# Patient Record
Sex: FEMALE | Race: OTHER | Employment: FULL TIME | URBAN - METROPOLITAN AREA
[De-identification: names, ages, dates, MRNs, and addresses within clinical notes are randomized per-mention and may not be internally consistent; named-entity substitution may affect disease eponyms.]

---

## 2017-03-13 ENCOUNTER — APPOINTMENT (OUTPATIENT)
Dept: CT IMAGING | Facility: HOSPITAL | Age: 47
End: 2017-03-13
Attending: EMERGENCY MEDICINE
Payer: COMMERCIAL

## 2017-03-13 ENCOUNTER — HOSPITAL ENCOUNTER (EMERGENCY)
Facility: HOSPITAL | Age: 47
Discharge: HOME | End: 2017-03-13
Attending: EMERGENCY MEDICINE
Payer: COMMERCIAL

## 2017-03-13 VITALS
HEART RATE: 74 BPM | OXYGEN SATURATION: 99 % | WEIGHT: 150 LBS | BODY MASS INDEX: 23.54 KG/M2 | SYSTOLIC BLOOD PRESSURE: 116 MMHG | HEIGHT: 67 IN | RESPIRATION RATE: 14 BRPM | DIASTOLIC BLOOD PRESSURE: 70 MMHG | TEMPERATURE: 97 F

## 2017-03-13 DIAGNOSIS — S16.1XXA CERVICAL STRAIN, INITIAL ENCOUNTER: ICD-10-CM

## 2017-03-13 DIAGNOSIS — S06.0X0A CONCUSSION, WITHOUT LOSS OF CONSCIOUSNESS, INITIAL ENCOUNTER: Primary | ICD-10-CM

## 2017-03-13 PROCEDURE — 99284 EMERGENCY DEPT VISIT MOD MDM: CPT

## 2017-03-13 PROCEDURE — 70450 CT HEAD/BRAIN W/O DYE: CPT

## 2017-03-13 PROCEDURE — 72125 CT NECK SPINE W/O DYE: CPT

## 2017-03-13 RX ORDER — ONDANSETRON 4 MG/1
4 TABLET, ORALLY DISINTEGRATING ORAL ONCE
Status: COMPLETED | OUTPATIENT
Start: 2017-03-13 | End: 2017-03-13

## 2017-03-14 NOTE — ED INITIAL ASSESSMENT (HPI)
Pt states was shoveling snow when she fell backwards striking her head on the concrete, denies LOC. Contusion to left parietal area, also c/o feeling nausea.

## 2017-03-14 NOTE — ED PROVIDER NOTES
Patient Seen in: [facility] Emergency Department    History   Patient presents with:  Head Neck Injury (neurologic, musculoskeletal)    Stated Complaint: FALL ON ICE     HPI    Patient is a 28-year-old female comes in emergency room for evaluation o Oropharynx is unremarkable, no exudate, dentition intact, no facial bone tenderness or septal hematomas, TMs clear bilaterally  NECK: Patient does have some midline cervical spine tenderness.   LUNG: Lungs clear to auscultation bilaterally, no wheezing, no extra-axial fluid collection. There is no evident fracture. Small retention cyst of the left maxillary sinus. Otherwise, the visualized paranasal sinuses and mastoid air cells are unremarkable.       3/13/2017  CONCLUSION:   #1. No acute intracranial hemo instructions. Patient was screened and evaluated during this visit.    As a treating physician attending to the patient, I determined, within reasonable clinical confidence and prior to discharge, that an emergency medical condition was not or was no paulina

## 2018-10-04 ENCOUNTER — CHARTING TRANS (OUTPATIENT)
Dept: OTHER | Age: 48
End: 2018-10-04

## 2018-10-04 ENCOUNTER — LAB SERVICES (OUTPATIENT)
Dept: OTHER | Age: 48
End: 2018-10-04

## 2018-10-04 LAB
APPEARANCE: NORMAL
BILIRUBIN: NORMAL
COLOR: NORMAL
GLUCOSE U: NORMAL
KETONES: NORMAL
LEUKOCYTES: NORMAL
NITRITE: NORMAL
PH: 6
PROTEIN: NORMAL
URINE SPEC GRAVITY: 1.01
UROBILINOGEN: 0.2

## 2018-10-15 ENCOUNTER — CHARTING TRANS (OUTPATIENT)
Dept: OTHER | Age: 48
End: 2018-10-15

## 2018-10-15 ENCOUNTER — LAB SERVICES (OUTPATIENT)
Dept: OTHER | Age: 48
End: 2018-10-15

## 2018-10-15 LAB
APPEARANCE: CLEAR
BILIRUBIN: NEGATIVE
COLOR: NORMAL
GLUCOSE U: NEGATIVE
KETONES: NEGATIVE
LEUKOCYTES: NORMAL
NITRITE: NEGATIVE
OCCULT BLOOD: NORMAL
PH: 5
PROTEIN: NEGATIVE
URINE SPEC GRAVITY: 1
UROBILINOGEN: 0.2

## 2018-10-24 ENCOUNTER — LAB SERVICES (OUTPATIENT)
Dept: OTHER | Age: 48
End: 2018-10-24

## 2018-10-24 ENCOUNTER — CHARTING TRANS (OUTPATIENT)
Dept: OTHER | Age: 48
End: 2018-10-24

## 2018-10-24 LAB
BILIRUBIN: NEGATIVE
GLUCOSE U: NEGATIVE
KETONES: NEGATIVE
LEUKOCYTES: NEGATIVE
NITRITE: NEGATIVE
OCCULT BLOOD: NORMAL
PH: 8
PROTEIN: NEGATIVE
URINE SPEC GRAVITY: 1
UROBILINOGEN: 0.2

## 2018-11-27 VITALS
WEIGHT: 143 LBS | HEART RATE: 88 BPM | TEMPERATURE: 98.7 F | RESPIRATION RATE: 16 BRPM | BODY MASS INDEX: 22.44 KG/M2 | DIASTOLIC BLOOD PRESSURE: 80 MMHG | SYSTOLIC BLOOD PRESSURE: 120 MMHG | HEIGHT: 67 IN

## 2018-11-27 VITALS
RESPIRATION RATE: 16 BRPM | BODY MASS INDEX: 22.4 KG/M2 | TEMPERATURE: 97.9 F | DIASTOLIC BLOOD PRESSURE: 76 MMHG | SYSTOLIC BLOOD PRESSURE: 118 MMHG | WEIGHT: 143 LBS | HEART RATE: 76 BPM

## 2018-11-27 VITALS
BODY MASS INDEX: 22.44 KG/M2 | DIASTOLIC BLOOD PRESSURE: 78 MMHG | SYSTOLIC BLOOD PRESSURE: 130 MMHG | RESPIRATION RATE: 16 BRPM | HEIGHT: 67 IN | WEIGHT: 143 LBS | TEMPERATURE: 98.2 F | HEART RATE: 80 BPM

## 2021-06-30 ENCOUNTER — OFFICE VISIT (OUTPATIENT)
Dept: URGENT CARE | Age: 51
End: 2021-06-30

## 2021-06-30 ENCOUNTER — TELEPHONE (OUTPATIENT)
Dept: SCHEDULING | Age: 51
End: 2021-06-30

## 2024-07-08 ENCOUNTER — TELEPHONE (OUTPATIENT)
Dept: INTERNAL MEDICINE CLINIC | Facility: CLINIC | Age: 54
End: 2024-07-08

## 2024-07-08 ENCOUNTER — OFFICE VISIT (OUTPATIENT)
Dept: INTERNAL MEDICINE CLINIC | Facility: CLINIC | Age: 54
End: 2024-07-08
Payer: COMMERCIAL

## 2024-07-08 VITALS
SYSTOLIC BLOOD PRESSURE: 120 MMHG | BODY MASS INDEX: 26.45 KG/M2 | WEIGHT: 162.63 LBS | OXYGEN SATURATION: 97 % | RESPIRATION RATE: 15 BRPM | HEIGHT: 65.75 IN | DIASTOLIC BLOOD PRESSURE: 60 MMHG | HEART RATE: 76 BPM | TEMPERATURE: 98 F

## 2024-07-08 DIAGNOSIS — Z12.11 COLON CANCER SCREENING: ICD-10-CM

## 2024-07-08 DIAGNOSIS — Z12.31 ENCOUNTER FOR SCREENING MAMMOGRAM FOR MALIGNANT NEOPLASM OF BREAST: ICD-10-CM

## 2024-07-08 DIAGNOSIS — Z23 IMMUNIZATION DUE: ICD-10-CM

## 2024-07-08 DIAGNOSIS — H53.9 VISION CHANGES: ICD-10-CM

## 2024-07-08 DIAGNOSIS — Z00.00 ANNUAL PHYSICAL EXAM: Primary | ICD-10-CM

## 2024-07-08 PROCEDURE — 90471 IMMUNIZATION ADMIN: CPT | Performed by: INTERNAL MEDICINE

## 2024-07-08 PROCEDURE — 99386 PREV VISIT NEW AGE 40-64: CPT | Performed by: INTERNAL MEDICINE

## 2024-07-08 PROCEDURE — 90715 TDAP VACCINE 7 YRS/> IM: CPT | Performed by: INTERNAL MEDICINE

## 2024-07-08 NOTE — H&P
Subjective:   Lenora Hill is a 54 year old female  who presents for Physical   New pt    Denies family hx of breast or colon cancer.  Denies breast or nipple changes.     Has followed with gyne.   In menopause. LMP 2022  Colon cancer screening: has declined colonoscopy but is open to cologuard.    Breast cancer screening: given order for mammogram    Cervical cancer screening: NIL and neg for HPV 11/10/22   Reports occasional vaginal dryness/hot flashes- uses OTC with relief.     Immunizations-to get tdap today.   Pt to schedule shingrix.     Mild vision changes uses readers.     Remainder of ROS negative.     History/Other:    Chief Complaint Reviewed and Verified  No Further Nursing Notes to   Review  Tobacco Reviewed  Allergies Reviewed  Medications Reviewed    Medical History Reviewed  Surgical History Reviewed  Family History   Reviewed  Social History Reviewed         No current outpatient medications on file.       Review of Systems:  Pertinent items are noted in HPI.  A comprehensive 10 point review of systems was completed.  Pertinent positives and negatives noted in the the HPI.        Objective:   Temp 97.5 °F (36.4 °C) (Temporal)   Ht 5' 5.75\" (1.67 m)   Wt 162 lb 9.6 oz (73.8 kg)   BMI 26.45 kg/m²  Estimated body mass index is 26.45 kg/m² as calculated from the following:    Height as of this encounter: 5' 5.75\" (1.67 m).    Weight as of this encounter: 162 lb 9.6 oz (73.8 kg).  PHYSICAL EXAM:   General: no acute distress   Eyes: pupils equal and reactive; EOMI; sclera normal; conjunctiva normal   ENT: without erythema or exudate  Neck: trachea midline; no adenopathy; thyroid not enlarged   Hematologic/lymphatic:no cervical lymphadenopathy; no supraclavicular adenopathy   Respiratory: no increased work of breathing; good air exchange; CTAB; no crackles or wheezing   Cardiovascular: RRR; S1, S2; no murmurs; no carotid bruits; no edema   Gastrointestinal: normal bowel sounds; soft;  non-distended; non-tender  Neurological: awake, alert, oriented x3; CNII-XII grossly intact;   MSK:good ROM; strength 5/5  Behavioral/Psych: euthymic; appropriate affect   Skin: normal skin color; no rashes; no lesions   Breasts: symmetrical; no masses or nipple discharge or rashes/lesions noted   : no external vaginal lesions noted. No copious discharge; vaginal atrophy/dryness present       Assessment & Plan:   Lenora was seen today for physical.    Diagnoses and all orders for this visit:    Annual physical exam  -     COLOGUARD COLON CANCER SCREENING (EXTERNAL)  -     Vitamin D; Future  -     CBC With Differential With Platelet; Future  -     Comp Metabolic Panel (14); Future  -     Hemoglobin A1C; Future  -     TSH W Reflex To Free T4; Future  -     Lipid Panel; Future  -     [facility] KYLE 2D+3D SCREENING BILAT (CPT=77067/03815); Future  -     TdaP (Adacel, Boostrix) [02122]    Encounter for screening mammogram for malignant neoplasm of breast  -     [facility] KYLE 2D+3D SCREENING BILAT (CPT=77067/44119); Future    Colon cancer screening  -     COLOGUARD COLON CANCER SCREENING (EXTERNAL)    Immunization due  -     TdaP (Adacel, Boostrix) [72933]    Vision changes  -     Ophthalmology Referral - In Network                Carol Woo MD

## 2024-07-08 NOTE — TELEPHONE ENCOUNTER
Outgoing Fax  I faxed over cologuard order to [facility]  sent original to scan placed copy in DV's accordion with confirmation.

## 2024-07-30 LAB
AMB EXT COLOGUARD RESULT: NEGATIVE
AMB EXT COLOGUARD RESULT: NEGATIVE

## 2024-08-07 ENCOUNTER — MED REC SCAN ONLY (OUTPATIENT)
Dept: INTERNAL MEDICINE CLINIC | Facility: CLINIC | Age: 54
End: 2024-08-07

## (undated) NOTE — ED AVS SNAPSHOT
[facility] Emergency Department    [place]  [address] [address]    Phone:  [phone]    Fax:  [phone]           Tana Webb   MRN: [id number]    Department:  [facility] Emergency Department   Date of Visit:  3/13/201 IF THERE IS ANY CHANGE OR WORSENING OF YOUR CONDITION, CALL YOUR PRIMARY CARE PHYSICIAN AT ONCE OR RETURN IMMEDIATELY TO THE EMERGENCY DEPARTMENT.     If you have been prescribed any medication(s), please fill your prescription right away and begin taking t

## (undated) NOTE — ED AVS SNAPSHOT
[facility] Emergency Department    [facility]  [address] [address]    Phone:  [phone]    Fax:  [phone]           Minna Brit   MRN: [id number]    Department:  [facility] Emergency Department   Date of Visit:  3/13/201 If you have any problems with your follow-up, please call our  at [phone]    Si usted tiene algun problema con wheatley sequimiento, por favor llame a nuestro adminstrador de casos al [phone]    Expect to receive an electronic request [facility] [address] ([address]) Radha ZaragozaEloisa [id number] [phone]   [facility] [address] ([address] [facility]) [address]  (Do PROCEDURE:  CT OF THE CERVICAL SPINE WITHOUT CONTRAST     COMPARISON:  None. INDICATIONS:  FALL ON ICE , HIT HEAD NO LOC, CONTUSION, NAUSEA     TECHNIQUE:  Noncontrast CT scanning of the cervical spine is performed from the skull base through C7.   Mul PATIENT STATED HISTORY:  Virginia Leash, hitting left posterior head, having confusion      FINDINGS:    Ventricles and sulci are within normal limits.   On image 36, there is a cortical and subcortical small region of hypoattenuation with loss of gray-white matter d